# Patient Record
(demographics unavailable — no encounter records)

---

## 2025-06-25 NOTE — HEALTH RISK ASSESSMENT
[Very Good] : ~his/her~  mood as very good [Yes] : Yes [2 - 4 times a month (2 pts)] : 2-4 times a month (2 points) [1 or 2 (0 pts)] : 1 or 2 (0 points) [Never (0 pts)] : Never (0 points) [No falls in past year] : Patient reported no falls in the past year [0] : 2) Feeling down, depressed, or hopeless: Not at all (0) [PHQ-2 Negative - No further assessment needed] : PHQ-2 Negative - No further assessment needed [Never] : Never [HIV test declined] : HIV test declined [Hepatitis C test declined] : Hepatitis C test declined [Employed] : employed [College] : College [Significant Other] : lives with significant other [Feels Safe at Home] : Feels safe at home [Fully functional (bathing, dressing, toileting, transferring, walking, feeding)] : Fully functional (bathing, dressing, toileting, transferring, walking, feeding) [Fully functional (using the telephone, shopping, preparing meals, housekeeping, doing laundry, using] : Fully functional and needs no help or supervision to perform IADLs (using the telephone, shopping, preparing meals, housekeeping, doing laundry, using transportation, managing medications and managing finances) [Seat Belt] :  uses seat belt [[id number]] : 0 [Reports changes in hearing] : Reports no changes in hearing [Reports changes in vision] : Reports no changes in vision [Reports changes in dental health] : Reports no changes in dental health

## 2025-06-25 NOTE — HISTORY OF PRESENT ILLNESS
[FreeTextEntry1] : CPE [de-identified] : GLO SHINE is a 22 year F with PMHx of mild asthma presenting for CPE. No h/o exacerbations, hospitalizations, or intubations. Needs refills of albuterol.   Has hormonal acne for which she follows with derm, plans to potentially start spironolactone.   Diet/exercise: No dietary restrictinos, going to GYM x2 months,  Social history: Live with partner. Takes pre-req for nurses, works at a bank teller. Lives in [place]. Social EtOh use, no tobacco or drug use.  Sexually active/Last menstrual period: Mirena IUD, regular periods, spotting x6 days. LMP GYN TVUS showed ovarian cyst. STD testing at GYN done but did not do syphilis testing.   SPECIALISTS:   None